# Patient Record
Sex: MALE | Race: BLACK OR AFRICAN AMERICAN | NOT HISPANIC OR LATINO | URBAN - METROPOLITAN AREA
[De-identification: names, ages, dates, MRNs, and addresses within clinical notes are randomized per-mention and may not be internally consistent; named-entity substitution may affect disease eponyms.]

---

## 2023-01-30 ENCOUNTER — OUTPATIENT (OUTPATIENT)
Dept: OUTPATIENT SERVICES | Facility: HOSPITAL | Age: 38
LOS: 1 days | Discharge: HOME | End: 2023-01-30

## 2024-03-20 ENCOUNTER — EMERGENCY (EMERGENCY)
Facility: HOSPITAL | Age: 39
LOS: 0 days | Discharge: HOME | End: 2024-03-21
Attending: EMERGENCY MEDICINE
Payer: MEDICAID

## 2024-03-20 VITALS
TEMPERATURE: 100 F | OXYGEN SATURATION: 98 % | SYSTOLIC BLOOD PRESSURE: 145 MMHG | RESPIRATION RATE: 18 BRPM | WEIGHT: 210.1 LBS | DIASTOLIC BLOOD PRESSURE: 85 MMHG | HEART RATE: 120 BPM | HEIGHT: 68 IN

## 2024-03-20 DIAGNOSIS — K04.7 PERIAPICAL ABSCESS WITHOUT SINUS: ICD-10-CM

## 2024-03-20 DIAGNOSIS — I10 ESSENTIAL (PRIMARY) HYPERTENSION: ICD-10-CM

## 2024-03-20 DIAGNOSIS — R51.9 HEADACHE, UNSPECIFIED: ICD-10-CM

## 2024-03-20 DIAGNOSIS — R60.9 EDEMA, UNSPECIFIED: ICD-10-CM

## 2024-03-20 DIAGNOSIS — R00.0 TACHYCARDIA, UNSPECIFIED: ICD-10-CM

## 2024-03-20 DIAGNOSIS — H57.10 OCULAR PAIN, UNSPECIFIED EYE: ICD-10-CM

## 2024-03-20 DIAGNOSIS — R93.0 ABNORMAL FINDINGS ON DIAGNOSTIC IMAGING OF SKULL AND HEAD, NOT ELSEWHERE CLASSIFIED: ICD-10-CM

## 2024-03-20 LAB
ALBUMIN SERPL ELPH-MCNC: 5 G/DL — SIGNIFICANT CHANGE UP (ref 3.5–5.2)
ALP SERPL-CCNC: 86 U/L — SIGNIFICANT CHANGE UP (ref 30–115)
ALT FLD-CCNC: 24 U/L — SIGNIFICANT CHANGE UP (ref 0–41)
ANION GAP SERPL CALC-SCNC: 14 MMOL/L — SIGNIFICANT CHANGE UP (ref 7–14)
APTT BLD: 31.2 SEC — SIGNIFICANT CHANGE UP (ref 27–39.2)
AST SERPL-CCNC: 18 U/L — SIGNIFICANT CHANGE UP (ref 0–41)
BASOPHILS # BLD AUTO: 0.02 K/UL — SIGNIFICANT CHANGE UP (ref 0–0.2)
BASOPHILS NFR BLD AUTO: 0.2 % — SIGNIFICANT CHANGE UP (ref 0–1)
BILIRUB SERPL-MCNC: 0.9 MG/DL — SIGNIFICANT CHANGE UP (ref 0.2–1.2)
BUN SERPL-MCNC: 12 MG/DL — SIGNIFICANT CHANGE UP (ref 10–20)
CALCIUM SERPL-MCNC: 10 MG/DL — SIGNIFICANT CHANGE UP (ref 8.4–10.5)
CHLORIDE SERPL-SCNC: 106 MMOL/L — SIGNIFICANT CHANGE UP (ref 98–110)
CO2 SERPL-SCNC: 24 MMOL/L — SIGNIFICANT CHANGE UP (ref 17–32)
CREAT SERPL-MCNC: 1.1 MG/DL — SIGNIFICANT CHANGE UP (ref 0.7–1.5)
EGFR: 88 ML/MIN/1.73M2 — SIGNIFICANT CHANGE UP
EOSINOPHIL # BLD AUTO: 0.03 K/UL — SIGNIFICANT CHANGE UP (ref 0–0.7)
EOSINOPHIL NFR BLD AUTO: 0.3 % — SIGNIFICANT CHANGE UP (ref 0–8)
GLUCOSE SERPL-MCNC: 110 MG/DL — HIGH (ref 70–99)
HCT VFR BLD CALC: 44.5 % — SIGNIFICANT CHANGE UP (ref 42–52)
HGB BLD-MCNC: 14.4 G/DL — SIGNIFICANT CHANGE UP (ref 14–18)
IMM GRANULOCYTES NFR BLD AUTO: 0.3 % — SIGNIFICANT CHANGE UP (ref 0.1–0.3)
INR BLD: 1.07 RATIO — SIGNIFICANT CHANGE UP (ref 0.65–1.3)
LACTATE SERPL-SCNC: 1.1 MMOL/L — SIGNIFICANT CHANGE UP (ref 0.7–2)
LYMPHOCYTES # BLD AUTO: 19.9 % — LOW (ref 20.5–51.1)
LYMPHOCYTES # BLD AUTO: 2.22 K/UL — SIGNIFICANT CHANGE UP (ref 1.2–3.4)
MCHC RBC-ENTMCNC: 28.1 PG — SIGNIFICANT CHANGE UP (ref 27–31)
MCHC RBC-ENTMCNC: 32.4 G/DL — SIGNIFICANT CHANGE UP (ref 32–37)
MCV RBC AUTO: 86.9 FL — SIGNIFICANT CHANGE UP (ref 80–94)
MONOCYTES # BLD AUTO: 1.09 K/UL — HIGH (ref 0.1–0.6)
MONOCYTES NFR BLD AUTO: 9.7 % — HIGH (ref 1.7–9.3)
NEUTROPHILS # BLD AUTO: 7.79 K/UL — HIGH (ref 1.4–6.5)
NEUTROPHILS NFR BLD AUTO: 69.6 % — SIGNIFICANT CHANGE UP (ref 42.2–75.2)
NRBC # BLD: 0 /100 WBCS — SIGNIFICANT CHANGE UP (ref 0–0)
PLATELET # BLD AUTO: 252 K/UL — SIGNIFICANT CHANGE UP (ref 130–400)
PMV BLD: 10.9 FL — HIGH (ref 7.4–10.4)
POTASSIUM SERPL-MCNC: 4.1 MMOL/L — SIGNIFICANT CHANGE UP (ref 3.5–5)
POTASSIUM SERPL-SCNC: 4.1 MMOL/L — SIGNIFICANT CHANGE UP (ref 3.5–5)
PROT SERPL-MCNC: 8 G/DL — SIGNIFICANT CHANGE UP (ref 6–8)
PROTHROM AB SERPL-ACNC: 12.2 SEC — SIGNIFICANT CHANGE UP (ref 9.95–12.87)
RBC # BLD: 5.12 M/UL — SIGNIFICANT CHANGE UP (ref 4.7–6.1)
RBC # FLD: 11.8 % — SIGNIFICANT CHANGE UP (ref 11.5–14.5)
SODIUM SERPL-SCNC: 144 MMOL/L — SIGNIFICANT CHANGE UP (ref 135–146)
WBC # BLD: 11.18 K/UL — HIGH (ref 4.8–10.8)
WBC # FLD AUTO: 11.18 K/UL — HIGH (ref 4.8–10.8)

## 2024-03-20 PROCEDURE — 70450 CT HEAD/BRAIN W/O DYE: CPT | Mod: MC

## 2024-03-20 PROCEDURE — 93010 ELECTROCARDIOGRAM REPORT: CPT | Mod: 77

## 2024-03-20 PROCEDURE — 93010 ELECTROCARDIOGRAM REPORT: CPT

## 2024-03-20 PROCEDURE — 85025 COMPLETE CBC W/AUTO DIFF WBC: CPT

## 2024-03-20 PROCEDURE — 99223 1ST HOSP IP/OBS HIGH 75: CPT

## 2024-03-20 PROCEDURE — 70551 MRI BRAIN STEM W/O DYE: CPT | Mod: MC

## 2024-03-20 PROCEDURE — 80053 COMPREHEN METABOLIC PANEL: CPT

## 2024-03-20 PROCEDURE — 83605 ASSAY OF LACTIC ACID: CPT

## 2024-03-20 PROCEDURE — G0378: CPT

## 2024-03-20 PROCEDURE — 85730 THROMBOPLASTIN TIME PARTIAL: CPT

## 2024-03-20 PROCEDURE — 96375 TX/PRO/DX INJ NEW DRUG ADDON: CPT | Mod: XU

## 2024-03-20 PROCEDURE — 87040 BLOOD CULTURE FOR BACTERIA: CPT

## 2024-03-20 PROCEDURE — 99285 EMERGENCY DEPT VISIT HI MDM: CPT | Mod: 25

## 2024-03-20 PROCEDURE — 70487 CT MAXILLOFACIAL W/DYE: CPT | Mod: 26,MC

## 2024-03-20 PROCEDURE — 70450 CT HEAD/BRAIN W/O DYE: CPT | Mod: 26,MC

## 2024-03-20 PROCEDURE — 93005 ELECTROCARDIOGRAM TRACING: CPT

## 2024-03-20 PROCEDURE — 70487 CT MAXILLOFACIAL W/DYE: CPT | Mod: MC

## 2024-03-20 PROCEDURE — 70496 CT ANGIOGRAPHY HEAD: CPT | Mod: MC

## 2024-03-20 PROCEDURE — 85610 PROTHROMBIN TIME: CPT

## 2024-03-20 PROCEDURE — 36415 COLL VENOUS BLD VENIPUNCTURE: CPT

## 2024-03-20 PROCEDURE — 96365 THER/PROPH/DIAG IV INF INIT: CPT | Mod: XU

## 2024-03-20 RX ORDER — AMPICILLIN SODIUM AND SULBACTAM SODIUM 250; 125 MG/ML; MG/ML
3 INJECTION, POWDER, FOR SUSPENSION INTRAMUSCULAR; INTRAVENOUS ONCE
Refills: 0 | Status: COMPLETED | OUTPATIENT
Start: 2024-03-20 | End: 2024-03-20

## 2024-03-20 RX ORDER — ACETAMINOPHEN 500 MG
975 TABLET ORAL ONCE
Refills: 0 | Status: COMPLETED | OUTPATIENT
Start: 2024-03-20 | End: 2024-03-20

## 2024-03-20 RX ORDER — KETOROLAC TROMETHAMINE 30 MG/ML
15 SYRINGE (ML) INJECTION ONCE
Refills: 0 | Status: DISCONTINUED | OUTPATIENT
Start: 2024-03-20 | End: 2024-03-20

## 2024-03-20 RX ORDER — SODIUM CHLORIDE 9 MG/ML
3000 INJECTION, SOLUTION INTRAVENOUS ONCE
Refills: 0 | Status: COMPLETED | OUTPATIENT
Start: 2024-03-20 | End: 2024-03-20

## 2024-03-20 RX ORDER — AMPICILLIN SODIUM AND SULBACTAM SODIUM 250; 125 MG/ML; MG/ML
3 INJECTION, POWDER, FOR SUSPENSION INTRAMUSCULAR; INTRAVENOUS EVERY 6 HOURS
Refills: 0 | Status: DISCONTINUED | OUTPATIENT
Start: 2024-03-20 | End: 2024-03-21

## 2024-03-20 RX ADMIN — SODIUM CHLORIDE 3000 MILLILITER(S): 9 INJECTION, SOLUTION INTRAVENOUS at 18:43

## 2024-03-20 RX ADMIN — AMPICILLIN SODIUM AND SULBACTAM SODIUM 200 GRAM(S): 250; 125 INJECTION, POWDER, FOR SUSPENSION INTRAMUSCULAR; INTRAVENOUS at 18:43

## 2024-03-20 RX ADMIN — Medication 15 MILLIGRAM(S): at 19:37

## 2024-03-20 RX ADMIN — AMPICILLIN SODIUM AND SULBACTAM SODIUM 3 GRAM(S): 250; 125 INJECTION, POWDER, FOR SUSPENSION INTRAMUSCULAR; INTRAVENOUS at 19:37

## 2024-03-20 RX ADMIN — Medication 975 MILLIGRAM(S): at 22:48

## 2024-03-20 RX ADMIN — Medication 15 MILLIGRAM(S): at 18:42

## 2024-03-20 NOTE — ED CDU PROVIDER INITIAL DAY NOTE - CLINICAL SUMMARY MEDICAL DECISION MAKING FREE TEXT BOX
Patient had initially presented with right facial swelling secondary to tooth pain.  Workup in ED showed dental infection without drainable abscess.  CT of the head however incidentally found an age-indeterminate subinsular focal hypodensity.  Neurology was therefore consulted and evaluated the patient in the ED.  They recommended observation for MRI at that time.  In ops, plan is for MRI and dental evaluation.  Will reevaluate pending workup.

## 2024-03-20 NOTE — CONSULT NOTE ADULT - ASSESSMENT
Patient is a 38y old  Male who presents with a chief complaint of facial swelling in the upper right quadrant. According to patient, the facial swelling started yesterday (03/19/2024). Patient went to Urgent Care and was prescribed Augment. Urgent Care told him to go to the Emergency Room if pain persists.     HPI:  None- Patient denies.     PAST MEDICAL & SURGICAL HISTORY:    ( -  ) heart valve replacement  ( -  ) joint replacement  ( -  ) pregnancy    MEDICATIONS  (STANDING):  acetaminophen     Tablet .. 975 milliGRAM(s) Oral once   Augmentin    MEDICATIONS  (PRN):    Allergies: No Known Allergies    Last Dental Visit: Patient was unable to recall; more than 1 year ago.     Vital Signs Last 24 Hrs  T(C): 38.2 (20 Mar 2024 18:04), Max: 38.2 (20 Mar 2024 18:04)  T(F): 100.8 (20 Mar 2024 18:04), Max: 100.8 (20 Mar 2024 18:04)  HR: 120 (20 Mar 2024 16:19) (120 - 120)  BP: 145/85 (20 Mar 2024 16:19) (145/85 - 145/85)  BP(mean): --  RR: 18 (20 Mar 2024 16:19) (18 - 18)  SpO2: 98% (20 Mar 2024 16:19) (98% - 98%)    Parameters below as of 20 Mar 2024 16:19  Patient On (Oxygen Delivery Method): room air    LABS:                        14.4   11.18 )-----------( 252      ( 20 Mar 2024 19:07 )             44.5     03-20    144  |  106  |  12  ----------------------------<  110<H>  4.1   |  24  |  1.1    Ca    10.0      20 Mar 2024 19:07    TPro  8.0  /  Alb  5.0  /  TBili  0.9  /  DBili  x   /  AST  18  /  ALT  24  /  AlkPhos  86  03-20    WBC Count: 11.18 K/uL *H* [4.80 - 10.80] (03-20 @ 19:07)  Platelet Count - Automated: 252 K/uL [130 - 400] (03-20 @ 19:07)  INR: 1.07 ratio [0.65 - 1.30] (03-20 @ 19:07)    Urinalysis Basic - ( 20 Mar 2024 19:07 )    Color: x / Appearance: x / SG: x / pH: x  Gluc: 110 mg/dL / Ketone: x  / Bili: x / Urobili: x   Blood: x / Protein: x / Nitrite: x   Leuk Esterase: x / RBC: x / WBC x   Sq Epi: x / Non Sq Epi: x / Bacteria: x    PT/INR - ( 20 Mar 2024 19:07 )   PT: 12.20 sec;   INR: 1.07 ratio      PTT - ( 20 Mar 2024 19:07 )  PTT:31.2 sec    EOE:  TMJ ( -  ) clicks                     ( -  ) pops                     ( -  ) crepitus             Mandible: WNL.              Facial bones and MOM: WNL.              ( -  ) trismus             ( -  ) lymphadenopathy             ( +  ) swelling             ( +  ) asymmetry             ( +  ) palpation             ( -  ) dyspnea             ( -  ) dysphagia             ( -  ) loss of consciousness    IOE:  Permanent dentition: Multiple carious teeth. Severe generalized build-up of plaque and calculus.            hard/soft palate:  ( -  ) palatal torus, No pathology noted.            tongue/FOM: No pathology noted.            labial/buccal mucosa: Vestibular swelling corresponding to Teeth #4 and #5.            ( +  ) percussion           ( +  ) palpation           ( +  ) swelling            ( -  ) abscess           ( -  ) sinus tract    Dentition present: Yes   Mobility: No   Caries: Yes     DENTAL RADIOGRAPHS: None taken.     RADIOLOGY & ADDITIONAL STUDIES: Not applicable.     ASSESSMENT: Upon extra-oral examination, patient had swelling in the upper right that was tender upon palpation. Right and left inferior borders of mandible were palpated. No signs of dysphagia, dyspnea, trismus, nor dysphonia. Upon extraocular muscle exam, patient had no difficulty in moving both eyes in any direction. Upon intra-oral examination, patient vestibular swelling in upper right, corresponding to Teeth #4 and #5. No fluctuant abscess was noted, but rather cellulitis. Patient had pain upon palpation of the upper right vestibule. Uvula was positioned at midline. Floor of mouth was soft upon palpation. Teeth #4 and #5 were root tips (gross decay). Patient has severe generalized build-up of plaque and calculus in both arches.     PLAN: The plan was to perform incision and drainage, have patient continue course of antibiotics, and have patient return to [facility] for extractions of Teeth #4 and #5. However, after attempting to administer local anesthesia, patient refused to continue procedure and did not want to have the incision and drainage completed. Explained the importance of having incision and drainage performed to prevent the infection from progressing, however, patient still denied the procedure.      PROCEDURE:   Verbal and written consent given.  Anesthesia: Topical anesthetic (with 20% benzocaine) placed. 0.5 carpule of 2% Lidocaine (with 1:100,000 epinephrine) administered. Local anesthesia was not completed because patient refused.   Treatment: No treatment performed because patient refused.     NEW PLAN: After speaking to ED providers, patient will be admitted and will be seen in Dental Clinic on 03/21/2024 for extractions of Teeth #4 and #5.     RECOMMENDATIONS:  1) Continue course of antibiotics.   2) Dental follow-up with [facility]) Dental Clinic on 03/21/2024.   3) If any difficulty swallowing/breathing, fever occur, return to ER.     Erika Sy, Pager #[phone]

## 2024-03-20 NOTE — ED PROVIDER NOTE - PHYSICAL EXAMINATION
CONSTITUTIONAL: Well-appearing; well-nourished; in no apparent distress.   EYES: PERRL; EOM intact.   ENT: normal nose; no rhinorrhea; normal pharynx with no tonsillar hypertrophy. R sided facial swelling from cheek extending to lower R eyelid.  NECK: Supple; non-tender; no cervical lymphadenopathy.    CARDIOVASCULAR: Normal S1, S2; no murmurs, rubs, or gallops.   RESPIRATORY: Normal chest excursion with respiration; breath sounds clear and equal bilaterally; no wheezes, rhonchi, or rales.  GI/: Non-distended; non-tender; no palpable organomegaly.   MS: No evidence of trauma or deformity. Normal ROM in all four extremities; non-tender to palpation; distal pulses are normal.   SKIN: Normal for age and race; warm; dry; good turgor; no apparent lesions or exudate.   NEURO/PSYCH: no drifting. strength equal to b/l upper and lower extremities. speaking coherently. nml cerebellum test. ambulate with nml and steady gait. no nuchal rigidity. negative Kernig’s and Brudzinski’s signs. A & O x 4; grossly unremarkable. mood and manner are appropriate.

## 2024-03-20 NOTE — ED PROVIDER NOTE - CLINICAL SUMMARY MEDICAL DECISION MAKING FREE TEXT BOX
38-year-old male with history of hypertension noncompliant with medication presenting today for right facial swelling as well as headache.  Patient febrile and tachycardic in the ED.  Significant edema noted to the right face likely secondary to dental abscess.  Extraocular movements are intact, pupils equal and reactive to light bilaterally.  Normal neurological exam however given patient's complaint of significant headache in setting of fever and tachycardia as well as dental abscess concern for cavernous sinus thrombosis.  Labs reviewed noted to have mild leukocytosis otherwise stable.  CT head with findings as dictated.  Dental evaluated patient, could not perform I&D bedside.  Patient given IV antibiotics.  Given concern for age-indeterminate infarct on CT, will be placed in ED OU for MRI.  Patient to have dental abscess drained in dental clinic tomorrow.

## 2024-03-20 NOTE — CONSULT NOTE ADULT - SUBJECTIVE AND OBJECTIVE BOX
Neurology Consult    Patient is a 38y old  Male who presents with a chief complaint of Facial swelling in the upper right quadrant. (20 Mar 2024 21:37)      HPI:  Mr. Gerardo reported that on monday he started to experience pain and swelling of his right jaw which progressively got worse over the next 2 days. He was seen at the  s/p Abx tx and recommended for dental f/u or ED if symptoms worsened. 03/20 morning he symptoms worsened w/ swelling of the entire right face and tender to touch. Pain is worse with mouth opening and smile. He denied pain w/ eye movement, closure or changes to his vision. He denied any prior h/o weakness, sensory changes, difficulty w/ speech or feeling confused. He is unaware of his FMHx as he is adopted. Currently employed as a , denied smoking, drinking or illicit drug use. He is physically active in basket ball. He stated that he was told by his girfriend that he snores in his sleep, stop breathing at times during sleep and often would wake out of his sleep from choking, similar episodes was also observed with his son.       PAST MEDICAL & SURGICAL HISTORY:      FAMILY HISTORY: adopted      Social History: (-) x 3    Allergies    No Known Allergies    Intolerances        MEDICATIONS  (STANDING):  ampicillin/sulbactam  IVPB 3 Gram(s) IV Intermittent every 6 hours    MEDICATIONS  (PRN):      Review of systems:    Constitutional: as per HPI  Eyes: No eye pain or discharge  ENMT:  No difficulty hearing; No sinus or throat pain  Neck: No pain or stiffness  Respiratory: No cough, wheezing, chills or hemoptysis  Cardiovascular: No chest pain, palpitations, shortness of breath, dyspnea on exertion  Gastrointestinal: No abdominal pain, nausea, vomiting or hematemesis; No diarrhea or constipation.   Genitourinary: No dysuria, frequency, hematuria or incontinence  Neurological: As per HPI  Skin: No rashes or lesions   Endocrine: No heat or cold intolerance; No hair loss  Musculoskeletal: No joint pain or swelling      Vital Signs Last 24 Hrs  T(C): 37.1 (20 Mar 2024 22:55), Max: 38.2 (20 Mar 2024 18:04)  T(F): 98.8 (20 Mar 2024 22:55), Max: 100.8 (20 Mar 2024 18:04)  HR: 94 (20 Mar 2024 22:50) (94 - 120)  BP: 140/83 (20 Mar 2024 21:47) (140/83 - 145/85)  BP(mean): --  RR: 18 (20 Mar 2024 21:47) (18 - 18)  SpO2: 98% (20 Mar 2024 21:47) (98% - 98%)    Parameters below as of 20 Mar 2024 16:19  Patient On (Oxygen Delivery Method): room air        Examination:  General:  Appearance is consistent with chronologic age.  No abnormal facies.  Gross skin survey within normal limits.    Cognitive/Language:  The patient is oriented to person, place, time and date.  Recent and remote memory intact.  Language with normal repetition, comprehension and naming.  Nondysarthric.    Eyes: intact VA, VFF.  EOMI w/o nystagmus, skew or reported double vision.  PERRL.  No ptosis/weakness of eyelid closure.    Face:  Facial sensation normal V1 - 3, right facial asymmetry due to maxillary and suborbital edema.   Ears/Nose/Throat:  Hearing grossly intact b/l.  Palate elevates midline.  Tongue and uvula midline. 2 necrotic looking upper premolar   Motor examination:   Normal tone, bulk and range of motion.  No tenderness, twitching, tremors or involuntary movements.  Formal Muscle Strength Testing: (MRC grade R/L) 5/5 UE; 5/5 LE.  No observable drift.  Reflexes:   2+ b/l biceps, triceps, brachioradialis, patella and Achilles.  Plantar response downgoing b/l. clonus absent.  Sensory examination:   Intact to light touch in all extremities.  Cerebellum:   FTN w/o dysmetria.   Gait narrow based and normal. Romberg Neg. Stable heel and toe     Labs:   CBC Full  -  ( 20 Mar 2024 19:07 )  WBC Count : 11.18 K/uL  RBC Count : 5.12 M/uL  Hemoglobin : 14.4 g/dL  Hematocrit : 44.5 %  Platelet Count - Automated : 252 K/uL  Mean Cell Volume : 86.9 fL  Mean Cell Hemoglobin : 28.1 pg  Mean Cell Hemoglobin Concentration : 32.4 g/dL  Auto Neutrophil # : 7.79 K/uL  Auto Lymphocyte # : 2.22 K/uL  Auto Monocyte # : 1.09 K/uL  Auto Eosinophil # : 0.03 K/uL  Auto Basophil # : 0.02 K/uL  Auto Neutrophil % : 69.6 %  Auto Lymphocyte % : 19.9 %  Auto Monocyte % : 9.7 %  Auto Eosinophil % : 0.3 %  Auto Basophil % : 0.2 %    03-20    144  |  106  |  12  ----------------------------<  110<H>  4.1   |  24  |  1.1    Ca    10.0      20 Mar 2024 19:07    TPro  8.0  /  Alb  5.0  /  TBili  0.9  /  DBili  x   /  AST  18  /  ALT  24  /  AlkPhos  86  03-20    LIVER FUNCTIONS - ( 20 Mar 2024 19:07 )  Alb: 5.0 g/dL / Pro: 8.0 g/dL / ALK PHOS: 86 U/L / ALT: 24 U/L / AST: 18 U/L / GGT: x           PT/INR - ( 20 Mar 2024 19:07 )   PT: 12.20 sec;   INR: 1.07 ratio         PTT - ( 20 Mar 2024 19:07 )  PTT:31.2 sec      Neuroimaging:  NCHCT: CT Head No Cont:   ACC: [id number] EXAM:  CT BRAIN   ORDERED BY: DWAIN LOTT     PROCEDURE DATE:  03/20/2024          INTERPRETATION:  CLINICAL INDICATION: Headache, fever with facial   swelling.    Technique: CT of the head was performed without contrast.    Multiple contiguous axial images were acquired from the skullbase to the   vertex without the administration of intravenous contrast.  Coronal and   sagittal reformations were made.    COMPARISON: None    FINDINGS:    The ventricles and sulci are unremarkable in appearance.    There is a focal hypodensity involving the right subinsular region,   series 2 image 11 There is no intraparenchymal hematoma, mass effect or   midline shift. No abnormal extra-axial fluid collections are present.    The calvarium is intact. The visualized intraorbital compartments,   paranasal sinuses and mastoid complexes appear free of acute disease.    IMPRESSION:  Focal hypodensity involving the right subinsular region which likely   reflects dilated perivascular space or age-indeterminate lacunar infarct.    --- End of Report ---            RENATA RICKETTS MD; Attending Radiologist  This document has been electronically signed. Mar 20 2024  8:17PM (03-20-24 @ 19:54)      03-20-24 @ 23:56       Neurology Consult    Patient is a 38y old  Male who presents with a chief complaint of Facial swelling in the upper right quadrant. (20 Mar 2024 21:37)      HPI:  Mr. Gerardo reported that on monday he started to experience pain and swelling of his right jaw which progressively got worse over the next 2 days. He was seen at the  s/p Abx tx and recommended for dental f/u or ED if symptoms worsened. 03/20 morning he symptoms worsened w/ swelling of the entire right face and tender to touch. Pain is worse with mouth opening and smile. He denied pain w/ eye movement, closure or changes to his vision. He denied any prior h/o weakness, sensory changes, difficulty w/ speech or feeling confused. He is unaware of his FMHx as he is adopted. Currently employed as a , denied smoking, drinking or illicit drug use. He is physically active in basket ball. He stated that he was told by his girlfriend that he snores in his sleep, stop breathing at times during sleep and often would wake out of his sleep from choking, similar episodes was also observed with his son.       PAST MEDICAL & SURGICAL HISTORY:      FAMILY HISTORY: adopted      Social History: (-) x 3    Allergies    No Known Allergies    Intolerances        MEDICATIONS  (STANDING):  ampicillin/sulbactam  IVPB 3 Gram(s) IV Intermittent every 6 hours    MEDICATIONS  (PRN):      Review of systems:    Constitutional: as per HPI  Eyes: No eye pain or discharge  ENMT:  No difficulty hearing; No sinus or throat pain  Neck: No pain or stiffness  Respiratory: No cough, wheezing, chills or hemoptysis  Cardiovascular: No chest pain, palpitations, shortness of breath, dyspnea on exertion  Gastrointestinal: No abdominal pain, nausea, vomiting or hematemesis; No diarrhea or constipation.   Genitourinary: No dysuria, frequency, hematuria or incontinence  Neurological: As per HPI  Skin: No rashes or lesions   Endocrine: No heat or cold intolerance; No hair loss  Musculoskeletal: No joint pain or swelling      Vital Signs Last 24 Hrs  T(C): 37.1 (20 Mar 2024 22:55), Max: 38.2 (20 Mar 2024 18:04)  T(F): 98.8 (20 Mar 2024 22:55), Max: 100.8 (20 Mar 2024 18:04)  HR: 94 (20 Mar 2024 22:50) (94 - 120)  BP: 140/83 (20 Mar 2024 21:47) (140/83 - 145/85)  BP(mean): --  RR: 18 (20 Mar 2024 21:47) (18 - 18)  SpO2: 98% (20 Mar 2024 21:47) (98% - 98%)    Parameters below as of 20 Mar 2024 16:19  Patient On (Oxygen Delivery Method): room air        Examination:  General:  Appearance is consistent with chronologic age.  No abnormal facies.  Gross skin survey within normal limits.    Cognitive/Language:  The patient is oriented to person, place, time and date.  Recent and remote memory intact.  Language with normal repetition, comprehension and naming.  Nondysarthric.    Eyes: intact VA, VFF.  EOMI w/o nystagmus, skew or reported double vision.  PERRL.  No ptosis/weakness of eyelid closure.    Face:  Facial sensation normal V1 - 3, right facial asymmetry due to maxillary and suborbital edema.   Ears/Nose/Throat:  Hearing grossly intact b/l.  Palate elevates midline.  Tongue and uvula midline. 2 necrotic looking upper premolar   Motor examination:   Normal tone, bulk and range of motion.  No tenderness, twitching, tremors or involuntary movements.  Formal Muscle Strength Testing: (MRC grade R/L) 5/5 UE; 5/5 LE.  No observable drift.  Reflexes:   2+ b/l biceps, triceps, brachioradialis, patella and Achilles.  Plantar response downgoing b/l. clonus absent.  Sensory examination:   Intact to light touch in all extremities.  Cerebellum:   FTN w/o dysmetria.   Gait narrow based and normal. Romberg Neg. Stable heel and toe   NIHSS 1 for right facial asymmetry    Labs:   CBC Full  -  ( 20 Mar 2024 19:07 )  WBC Count : 11.18 K/uL  RBC Count : 5.12 M/uL  Hemoglobin : 14.4 g/dL  Hematocrit : 44.5 %  Platelet Count - Automated : 252 K/uL  Mean Cell Volume : 86.9 fL  Mean Cell Hemoglobin : 28.1 pg  Mean Cell Hemoglobin Concentration : 32.4 g/dL  Auto Neutrophil # : 7.79 K/uL  Auto Lymphocyte # : 2.22 K/uL  Auto Monocyte # : 1.09 K/uL  Auto Eosinophil # : 0.03 K/uL  Auto Basophil # : 0.02 K/uL  Auto Neutrophil % : 69.6 %  Auto Lymphocyte % : 19.9 %  Auto Monocyte % : 9.7 %  Auto Eosinophil % : 0.3 %  Auto Basophil % : 0.2 %    03-20    144  |  106  |  12  ----------------------------<  110<H>  4.1   |  24  |  1.1    Ca    10.0      20 Mar 2024 19:07    TPro  8.0  /  Alb  5.0  /  TBili  0.9  /  DBili  x   /  AST  18  /  ALT  24  /  AlkPhos  86  03-20    LIVER FUNCTIONS - ( 20 Mar 2024 19:07 )  Alb: 5.0 g/dL / Pro: 8.0 g/dL / ALK PHOS: 86 U/L / ALT: 24 U/L / AST: 18 U/L / GGT: x           PT/INR - ( 20 Mar 2024 19:07 )   PT: 12.20 sec;   INR: 1.07 ratio         PTT - ( 20 Mar 2024 19:07 )  PTT:31.2 sec      Neuroimaging:  NCHCT: CT Head No Cont:   ACC: [id number] EXAM:  CT BRAIN   ORDERED BY: DWAIN LOTT     PROCEDURE DATE:  03/20/2024          INTERPRETATION:  CLINICAL INDICATION: Headache, fever with facial   swelling.    Technique: CT of the head was performed without contrast.    Multiple contiguous axial images were acquired from the skullbase to the   vertex without the administration of intravenous contrast.  Coronal and   sagittal reformations were made.    COMPARISON: None    FINDINGS:    The ventricles and sulci are unremarkable in appearance.    There is a focal hypodensity involving the right subinsular region,   series 2 image 11 There is no intraparenchymal hematoma, mass effect or   midline shift. No abnormal extra-axial fluid collections are present.    The calvarium is intact. The visualized intraorbital compartments,   paranasal sinuses and mastoid complexes appear free of acute disease.    IMPRESSION:  Focal hypodensity involving the right subinsular region which likely   reflects dilated perivascular space or age-indeterminate lacunar infarct.    --- End of Report ---            RENATA RICKETTS MD; Attending Radiologist  This document has been electronically signed. Mar 20 2024  8:17PM (03-20-24 @ 19:54)      03-20-24 @ 23:56

## 2024-03-20 NOTE — ED ADULT NURSE NOTE - OBJECTIVE STATEMENT
pt is ax4, on room air, ambulatory w/ a steady gait, air way is clear, pt reports  swelling of the R cheek on Monday. Pt. states he has a cut inside his mouth on the R side. Pt. c/o R eye swelling, headache and pain behind the eye since this morning. Pt. was prescribed Augmentin at ; pt. took 1 dose.

## 2024-03-20 NOTE — ED PROVIDER NOTE - PROGRESS NOTE DETAILS
neuro rec OBS for MRI, CTA. dental states they can also perform extraction in clinic tomorrow so pt agreed to stay. will cont abx, re c/s neuro and dental in AM pt could not tolerate drainage bedside, wants definite tx with extraction.

## 2024-03-20 NOTE — ED ADULT TRIAGE NOTE - CHIEF COMPLAINT QUOTE
Pt. c/o swelling of the R cheek on Monday. Pt. states he has a cut inside his mouth on the R side. Pt. c/o R eye swelling, headache and pain behind the eye since this morning. Pt. was prescribed Augmentin at ; pt. took 1 dose.

## 2024-03-20 NOTE — CONSULT NOTE ADULT - CONSULT REASON
CTH finding of focal hypodensity in the R subinsular region - concern for dilated perivascular space vs age-indeterminate lacunar infarct
Facial swelling in the upper right quadrant.

## 2024-03-20 NOTE — CONSULT NOTE ADULT - ATTENDING COMMENTS
Stroke team consulted for incidental CT head finding which is not appreciated on MRI brain, thus likely reflecting artifact. please call with any further questions/concerns.

## 2024-03-20 NOTE — ED PROVIDER NOTE - OBJECTIVE STATEMENT
pt presents to ED c/o R sided facial swelling that worsened today after experiencing pain in the cheek ?cut for the last few days. also c/o R sided HA. went to UC this AM and was given augmentin which he took one dose of, but notes no improvement. pain is sharp, nonradiating, moderate. denies exacerbating or relieving factors. Denies fever/chill/dizziness/chest pain/palpitation/sob/abd pain/n/v/d/ black stool/bloody stool/urinary sxs

## 2024-03-20 NOTE — CONSULT NOTE ADULT - ASSESSMENT
39yo RHM (adopted) w/ ?Sleep Apnea presenting for Tender R facial edema. Neurovascular consulted for CTH w/ R subinsular hypodensity. Other than right facial asymmetry due to edema exam w/o aphasia, dysarthria motor or sensory deficit. FMHx is unknown due to adoption however, current risk factor for stroke include Sex, obesity and ?sleep apnea. Will need further image to clarify stroke however, would need sleep study to w/u sleep apnea.     Impression  incidental Silent stroke of the right insular region vs artifact    Recommendation  Obs for MRI Brain w/wo considering facial infection  telemonitoring   c.w ABx for facial infection   39yo RHM (adopted) w/ ?Sleep Apnea presenting for Tender R facial edema. Neurovascular consulted for CTH w/ R subinsular hypodensity. Other than right facial asymmetry due to edema exam w/o aphasia, dysarthria motor or sensory deficit. FMHx is unknown due to adoption however, current risk factor for stroke include Sex, obesity and ?sleep apnea. Will need further image to clarify stroke however, would need sleep study to w/u sleep apnea.     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 Neurovascular consulted for CTH w/ R subinsular hypodensity. Other than right facial asymmetry due to edema exam w/o aphasia, dysarthria motor or sensory deficit. FMHx is unknown due to adoption however, current risk factor for stroke include Sex, obesity and ?sleep apnea. Will need further image to clarify stroke however, would need sleep study to w/u sleep apnea.     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

## 2024-03-21 VITALS
RESPIRATION RATE: 18 BRPM | OXYGEN SATURATION: 97 % | DIASTOLIC BLOOD PRESSURE: 88 MMHG | TEMPERATURE: 99 F | HEART RATE: 99 BPM | SYSTOLIC BLOOD PRESSURE: 154 MMHG

## 2024-03-21 PROCEDURE — 70496 CT ANGIOGRAPHY HEAD: CPT | Mod: 26,MC

## 2024-03-21 PROCEDURE — 99283 EMERGENCY DEPT VISIT LOW MDM: CPT

## 2024-03-21 PROCEDURE — 99239 HOSP IP/OBS DSCHRG MGMT >30: CPT

## 2024-03-21 PROCEDURE — 70551 MRI BRAIN STEM W/O DYE: CPT | Mod: 26,MC

## 2024-03-21 RX ORDER — IBUPROFEN 200 MG
600 TABLET ORAL ONCE
Refills: 0 | Status: COMPLETED | OUTPATIENT
Start: 2024-03-21 | End: 2024-03-21

## 2024-03-21 RX ADMIN — AMPICILLIN SODIUM AND SULBACTAM SODIUM 200 GRAM(S): 250; 125 INJECTION, POWDER, FOR SUSPENSION INTRAMUSCULAR; INTRAVENOUS at 06:15

## 2024-03-21 RX ADMIN — Medication 600 MILLIGRAM(S): at 04:49

## 2024-03-21 NOTE — ED CDU PROVIDER DISPOSITION NOTE - NSFOLLOWUPINSTRUCTIONS_ED_ALL_ED_FT
Follow-up in dental clinic today for extraction of teeth #4 and #5 per the Dental team.    Dental Abscess  A dental abscess is a collection of pus in or around a tooth that results from an infection. An abscess can cause pain in the affected area as well as other symptoms. Treatment is important to help with symptoms and to prevent the infection from spreading.    What are the causes?  This condition is caused by a bacterial infection around the root of the tooth that involves the inner part of the tooth (pulp). It may result from:    Severe tooth decay.  Trauma to the tooth, such as a broken or chipped tooth, that allows bacteria to enter into the pulp.  Severe gum disease around a tooth.    What increases the risk?  This condition is more likely to develop in males. It is also more likely to develop in people who:    Have dental decay (cavities).  Eat sugary snacks between meals.  Use tobacco products.  Have diabetes.  Have a weakened disease-fighting system (immune system).  Do not brush and care for their teeth regularly.    What are the signs or symptoms?  Symptoms of this condition include:    Severe pain in and around the infected tooth.  Swelling and redness around the infected tooth, in the mouth, or in the face.  Tenderness.  Pus drainage.  Bad breath.  Bitter taste in the mouth.  Difficulty swallowing.  Difficulty opening the mouth.  Nausea.  Vomiting.  Chills.  Swollen neck glands.  Fever.    How is this diagnosed?  This condition is diagnosed based on:    Your symptoms and your medical and dental history.  An examination of the infected tooth. During the exam, your dentist may tap on the infected tooth.    You may also have X-rays of the affected area.    How is this treated?  This condition is treated by getting rid of the infection. This may be done with:    Incision and drainage. This procedure is done by making an incision in the abscess to drain out the pus. Removing pus is the first priority in treating an abscess.  Antibiotic medicines. These may be used in certain situations.  Antibacterial mouth rinse.  A root canal. This may be performed to save the tooth. Your dentist accesses the visible part of your tooth (crown) with a drill and removes any damaged pulp. Then the space is filled and sealed off.  Tooth extraction. The tooth is pulled out if it cannot be saved by other treatment.    You may also receive treatment for pain, such as:    Acetaminophen or NSAIDs.  Gels that contain a numbing medicine.  An injection to block the pain near your nerve.    Follow these instructions at home:  Medicines     Take over-the-counter and prescription medicines only as told by your dentist.  If you were prescribed an antibiotic, take it as told by your dentist. Do not stop taking the antibiotic even if you start to feel better.  If you were prescribed a gel that contains a numbing medicine, use it exactly as told in the directions. Do not use these gels for children who are younger than 2 years of age.  Do not drive or use heavy machinery while taking prescription pain medicine.  General instructions     Rinse out your mouth often with salt water to relieve pain or swelling. To make a salt-water mixture, completely dissolve ½–1 tsp of salt in 1 cup of warm water.  Eat a soft diet while your abscess is healing.  Drink enough fluid to keep your urine pale yellow.  Do not apply heat to the outside of your mouth.  Do not use any products that contain nicotine or tobacco, such as cigarettes and e-cigarettes. If you need help quitting, ask your health care provider.  Keep all follow-up visits as told by your dentist. This is important.  How is this prevented?  Brush your teeth every morning and night with fluoride toothpaste. Floss one time each day.  Get regularly scheduled dental cleanings.  Consider having a dental sealant applied on teeth that have deep holes (caries).  Drink fluoridated water regularly. This includes most tap water. Check the label on bottled water to see if it contains fluoride.  Drink water instead of sugary drinks.  Eat healthy meals and snacks.  Wear a mouth guard or face shield to protect your teeth while playing sports.  Contact a health care provider if:  Your pain is worse and is not helped by medicine.  Get help right away if:  You have a fever or chills.  Your symptoms suddenly get worse.  You have a very bad headache.  You have problems breathing or swallowing.  You have trouble opening your mouth.  You have swelling in your neck or around your eye.  Summary  A dental abscess is a collection of pus in or around a tooth that results from an infection.  A dental abscess may result from severe tooth decay, trauma to the tooth, or severe gum disease around a tooth.  Symptoms include severe pain, swelling, redness, and drainage of pus in and around the infected tooth.  The first priority in treating a dental abscess is to drain out the pus. Treatment may also involve removing damage inside the tooth (root canal) or pulling out (extracting) the tooth.  This information is not intended to replace advice given to you by your health care provider. Make sure you discuss any questions you have with your health care provider.

## 2024-03-21 NOTE — ED CDU PROVIDER DISPOSITION NOTE - CLINICAL COURSE
Patient had initially presented with right facial swelling secondary to tooth pain.  Workup in ED showed dental infection without drainable abscess.  CT of the head however incidentally found an age-indeterminate subinsular focal hypodensity.  Neurology was therefore consulted and evaluated the patient in the ED.  They recommended observation for MRI at that time. In obs, MRI obtained and unremarkable and therefore patient cleared from neuro standpoint. Patient to be discharge from ED and sent to dental clinic for definitive dental care. Patient agreeable with plan.

## 2024-03-21 NOTE — ED CDU PROVIDER SUBSEQUENT DAY NOTE - CLINICAL SUMMARY MEDICAL DECISION MAKING FREE TEXT BOX
Patient had initially presented with right facial swelling secondary to tooth pain.  Workup in ED showed dental infection without drainable abscess.  CT of the head however incidentally found an age-indeterminate subinsular focal hypodensity.  Neurology was therefore consulted and evaluated the patient in the ED.  They recommended observation for MRI at that time. In obs, MRI obtained and unremarkable and therefore patient cleared from neuro standpoint. Pending dental recs. Will reassess

## 2024-03-21 NOTE — ED CDU PROVIDER SUBSEQUENT DAY NOTE - HISTORY
FF: pt pending Obs for MRI Brain w/wo, CTA H/N , and CTV. radiology will get pt for ct as soon as reviewed protocol with radiologist in regards to getting cta and ctv

## 2024-03-21 NOTE — ED ADULT NURSE REASSESSMENT NOTE - NS ED NURSE REASSESS COMMENT FT1
patient alert and oriented. no sign of distress. awaiting MRI. safety measures in place.  VS monitoring continued.

## 2024-03-21 NOTE — ED CDU PROVIDER DISPOSITION NOTE - PATIENT PORTAL LINK FT
You can access the FollowMyHealth Patient Portal offered by [facility] by registering at the following website: [email or web address]. By joining [facility]’s FollowMyHealth portal, you will also be able to view your health information using other applications (apps) compatible with our system.

## 2024-03-21 NOTE — ED CDU PROVIDER SUBSEQUENT DAY NOTE - PROGRESS NOTE DETAILS
MM: Patient evaluated by neurology, MRI results reviewed, per neurology patient does not require further neurological evaluation, will follow-up outpatient with PMD.  Patient to be discharged to dental clinic for further evaluation of teeth. Patient made aware of plan, no questions.

## 2024-03-25 LAB
CULTURE RESULTS: SIGNIFICANT CHANGE UP
SPECIMEN SOURCE: SIGNIFICANT CHANGE UP

## 2025-02-12 NOTE — ED PROVIDER NOTE - INTERNATIONAL TRAVEL
Labs are stable except for thyroid which was addressed in previous note.  Continue your current doses of medications. Keep up the good work.  Thanks.  [facility]   
[facility] (URI)   
Labs are stable except for thyroid which was addressed in previous note.  Continue your current doses of medications. Keep up the good work.  Thanks.  [facility] 
No